# Patient Record
Sex: MALE | Race: WHITE | NOT HISPANIC OR LATINO | Employment: OTHER | URBAN - METROPOLITAN AREA
[De-identification: names, ages, dates, MRNs, and addresses within clinical notes are randomized per-mention and may not be internally consistent; named-entity substitution may affect disease eponyms.]

---

## 2024-08-24 ENCOUNTER — APPOINTMENT (OUTPATIENT)
Dept: RADIOLOGY | Facility: HOSPITAL | Age: 69
End: 2024-08-24
Payer: MEDICARE

## 2024-08-24 ENCOUNTER — HOSPITAL ENCOUNTER (EMERGENCY)
Facility: HOSPITAL | Age: 69
Discharge: HOME | End: 2024-08-24
Attending: STUDENT IN AN ORGANIZED HEALTH CARE EDUCATION/TRAINING PROGRAM
Payer: MEDICARE

## 2024-08-24 VITALS
HEIGHT: 72 IN | DIASTOLIC BLOOD PRESSURE: 85 MMHG | OXYGEN SATURATION: 95 % | WEIGHT: 230 LBS | BODY MASS INDEX: 31.15 KG/M2 | HEART RATE: 72 BPM | RESPIRATION RATE: 16 BRPM | SYSTOLIC BLOOD PRESSURE: 195 MMHG

## 2024-08-24 DIAGNOSIS — S73.005A DISLOCATION OF LEFT HIP, INITIAL ENCOUNTER (MULTI): Primary | ICD-10-CM

## 2024-08-24 DIAGNOSIS — R03.0 ELEVATED BLOOD PRESSURE READING: ICD-10-CM

## 2024-08-24 LAB
ABO GROUP (TYPE) IN BLOOD: NORMAL
ALBUMIN SERPL BCP-MCNC: 3.1 G/DL (ref 3.4–5)
ALP SERPL-CCNC: 40 U/L (ref 33–136)
ALT SERPL W P-5'-P-CCNC: 13 U/L (ref 10–52)
ANION GAP SERPL CALC-SCNC: 16 MMOL/L (ref 10–20)
ANTIBODY SCREEN: NORMAL
APTT PPP: 22 SECONDS (ref 27–38)
AST SERPL W P-5'-P-CCNC: 14 U/L (ref 9–39)
BASOPHILS # BLD AUTO: 0.05 X10*3/UL (ref 0–0.1)
BASOPHILS NFR BLD AUTO: 0.5 %
BILIRUB SERPL-MCNC: 0.4 MG/DL (ref 0–1.2)
BUN SERPL-MCNC: 12 MG/DL (ref 6–23)
CALCIUM SERPL-MCNC: 6.7 MG/DL (ref 8.6–10.3)
CHLORIDE SERPL-SCNC: 115 MMOL/L (ref 98–107)
CO2 SERPL-SCNC: 13 MMOL/L (ref 21–32)
CREAT SERPL-MCNC: 0.7 MG/DL (ref 0.5–1.3)
EGFRCR SERPLBLD CKD-EPI 2021: >90 ML/MIN/1.73M*2
EOSINOPHIL # BLD AUTO: 0.1 X10*3/UL (ref 0–0.7)
EOSINOPHIL NFR BLD AUTO: 1 %
ERYTHROCYTE [DISTWIDTH] IN BLOOD BY AUTOMATED COUNT: 13.6 % (ref 11.5–14.5)
GLUCOSE SERPL-MCNC: 76 MG/DL (ref 74–99)
HCT VFR BLD AUTO: 40.1 % (ref 41–52)
HGB BLD-MCNC: 13.9 G/DL (ref 13.5–17.5)
IMM GRANULOCYTES # BLD AUTO: 0.04 X10*3/UL (ref 0–0.7)
IMM GRANULOCYTES NFR BLD AUTO: 0.4 % (ref 0–0.9)
INR PPP: 1.1 (ref 0.9–1.1)
LYMPHOCYTES # BLD AUTO: 1.45 X10*3/UL (ref 1.2–4.8)
LYMPHOCYTES NFR BLD AUTO: 14.6 %
MCH RBC QN AUTO: 30.9 PG (ref 26–34)
MCHC RBC AUTO-ENTMCNC: 34.7 G/DL (ref 32–36)
MCV RBC AUTO: 89 FL (ref 80–100)
MONOCYTES # BLD AUTO: 0.96 X10*3/UL (ref 0.1–1)
MONOCYTES NFR BLD AUTO: 9.7 %
NEUTROPHILS # BLD AUTO: 7.34 X10*3/UL (ref 1.2–7.7)
NEUTROPHILS NFR BLD AUTO: 73.8 %
NRBC BLD-RTO: 0 /100 WBCS (ref 0–0)
PLATELET # BLD AUTO: 240 X10*3/UL (ref 150–450)
POTASSIUM SERPL-SCNC: 3.1 MMOL/L (ref 3.5–5.3)
PROT SERPL-MCNC: 5.1 G/DL (ref 6.4–8.2)
PROTHROMBIN TIME: 11.9 SECONDS (ref 9.8–12.8)
RBC # BLD AUTO: 4.5 X10*6/UL (ref 4.5–5.9)
RH FACTOR (ANTIGEN D): NORMAL
SODIUM SERPL-SCNC: 141 MMOL/L (ref 136–145)
WBC # BLD AUTO: 9.9 X10*3/UL (ref 4.4–11.3)

## 2024-08-24 PROCEDURE — 85610 PROTHROMBIN TIME: CPT

## 2024-08-24 PROCEDURE — 96374 THER/PROPH/DIAG INJ IV PUSH: CPT | Mod: 59 | Performed by: STUDENT IN AN ORGANIZED HEALTH CARE EDUCATION/TRAINING PROGRAM

## 2024-08-24 PROCEDURE — 2500000001 HC RX 250 WO HCPCS SELF ADMINISTERED DRUGS (ALT 637 FOR MEDICARE OP)

## 2024-08-24 PROCEDURE — 2500000005 HC RX 250 GENERAL PHARMACY W/O HCPCS: Performed by: STUDENT IN AN ORGANIZED HEALTH CARE EDUCATION/TRAINING PROGRAM

## 2024-08-24 PROCEDURE — 27265 TREAT HIP DISLOCATION: CPT | Mod: LT | Performed by: STUDENT IN AN ORGANIZED HEALTH CARE EDUCATION/TRAINING PROGRAM

## 2024-08-24 PROCEDURE — 80053 COMPREHEN METABOLIC PANEL: CPT

## 2024-08-24 PROCEDURE — 86900 BLOOD TYPING SEROLOGIC ABO: CPT

## 2024-08-24 PROCEDURE — 99152 MOD SED SAME PHYS/QHP 5/>YRS: CPT | Performed by: STUDENT IN AN ORGANIZED HEALTH CARE EDUCATION/TRAINING PROGRAM

## 2024-08-24 PROCEDURE — 36415 COLL VENOUS BLD VENIPUNCTURE: CPT

## 2024-08-24 PROCEDURE — 2500000004 HC RX 250 GENERAL PHARMACY W/ HCPCS (ALT 636 FOR OP/ED)

## 2024-08-24 PROCEDURE — 73502 X-RAY EXAM HIP UNI 2-3 VIEWS: CPT | Mod: LT

## 2024-08-24 PROCEDURE — 73502 X-RAY EXAM HIP UNI 2-3 VIEWS: CPT | Mod: LEFT SIDE | Performed by: RADIOLOGY

## 2024-08-24 PROCEDURE — 96361 HYDRATE IV INFUSION ADD-ON: CPT | Mod: 59 | Performed by: STUDENT IN AN ORGANIZED HEALTH CARE EDUCATION/TRAINING PROGRAM

## 2024-08-24 PROCEDURE — 2500000004 HC RX 250 GENERAL PHARMACY W/ HCPCS (ALT 636 FOR OP/ED): Performed by: STUDENT IN AN ORGANIZED HEALTH CARE EDUCATION/TRAINING PROGRAM

## 2024-08-24 PROCEDURE — 99285 EMERGENCY DEPT VISIT HI MDM: CPT | Mod: 25 | Performed by: STUDENT IN AN ORGANIZED HEALTH CARE EDUCATION/TRAINING PROGRAM

## 2024-08-24 PROCEDURE — 85025 COMPLETE CBC W/AUTO DIFF WBC: CPT

## 2024-08-24 PROCEDURE — 85730 THROMBOPLASTIN TIME PARTIAL: CPT

## 2024-08-24 PROCEDURE — 73501 X-RAY EXAM HIP UNI 1 VIEW: CPT | Mod: LT

## 2024-08-24 RX ORDER — POTASSIUM CHLORIDE 1.5 G/1.58G
40 POWDER, FOR SOLUTION ORAL ONCE
Status: COMPLETED | OUTPATIENT
Start: 2024-08-24 | End: 2024-08-24

## 2024-08-24 RX ORDER — PROPOFOL 10 MG/ML
100 INJECTION, EMULSION INTRAVENOUS ONCE
Status: COMPLETED | OUTPATIENT
Start: 2024-08-24 | End: 2024-08-24

## 2024-08-24 RX ORDER — PROPOFOL 10 MG/ML
100 INJECTION, EMULSION INTRAVENOUS AS NEEDED
Status: COMPLETED | OUTPATIENT
Start: 2024-08-24 | End: 2024-08-24

## 2024-08-24 RX ORDER — OXYCODONE HYDROCHLORIDE 5 MG/1
5 TABLET ORAL EVERY 6 HOURS PRN
Qty: 12 TABLET | Refills: 0 | Status: SHIPPED | OUTPATIENT
Start: 2024-08-24 | End: 2024-08-27

## 2024-08-24 RX ORDER — HYDROMORPHONE HYDROCHLORIDE 1 MG/ML
1 INJECTION, SOLUTION INTRAMUSCULAR; INTRAVENOUS; SUBCUTANEOUS ONCE
Status: COMPLETED | OUTPATIENT
Start: 2024-08-24 | End: 2024-08-24

## 2024-08-24 RX ORDER — PROPOFOL 10 MG/ML
0.5 INJECTION, EMULSION INTRAVENOUS AS NEEDED
Status: DISCONTINUED | OUTPATIENT
Start: 2024-08-24 | End: 2024-08-24

## 2024-08-24 RX ORDER — IBUPROFEN 800 MG/1
800 TABLET ORAL AS NEEDED
Qty: 9 TABLET | Refills: 0 | Status: SHIPPED | OUTPATIENT
Start: 2024-08-24 | End: 2024-08-27

## 2024-08-24 RX ADMIN — HYDROMORPHONE HYDROCHLORIDE 1 MG: 1 INJECTION, SOLUTION INTRAMUSCULAR; INTRAVENOUS; SUBCUTANEOUS at 07:55

## 2024-08-24 RX ADMIN — SODIUM CHLORIDE 1000 ML: 9 INJECTION, SOLUTION INTRAVENOUS at 09:50

## 2024-08-24 RX ADMIN — PROPOFOL 100 MG: 10 INJECTION, EMULSION INTRAVENOUS at 09:35

## 2024-08-24 RX ADMIN — POTASSIUM CHLORIDE 40 MEQ: 1.5 POWDER, FOR SOLUTION ORAL at 11:20

## 2024-08-24 RX ADMIN — Medication 2 L/MIN: at 09:20

## 2024-08-24 RX ADMIN — PROPOFOL 100 MG: 10 INJECTION, EMULSION INTRAVENOUS at 09:53

## 2024-08-24 SDOH — HEALTH STABILITY: MENTAL HEALTH: HAVE YOU WISHED YOU WERE DEAD OR WISHED YOU COULD GO TO SLEEP AND NOT WAKE UP?: NO

## 2024-08-24 SDOH — HEALTH STABILITY: MENTAL HEALTH: SUICIDE ASSESSMENT: ADULT (C-SSRS)

## 2024-08-24 SDOH — HEALTH STABILITY: MENTAL HEALTH: HAVE YOU ACTUALLY HAD ANY THOUGHTS OF KILLING YOURSELF?: NO

## 2024-08-24 SDOH — HEALTH STABILITY: MENTAL HEALTH: HAVE YOU EVER DONE ANYTHING, STARTED TO DO ANYTHING, OR PREPARED TO DO ANYTHING TO END YOUR LIFE?: NO

## 2024-08-24 ASSESSMENT — PAIN SCALES - GENERAL
PAINLEVEL_OUTOF10: 3
PAINLEVEL_OUTOF10: 6
PAINLEVEL_OUTOF10: 3
PAINLEVEL_OUTOF10: 6
PAINLEVEL_OUTOF10: 9
PAINLEVEL_OUTOF10: 6
PAINLEVEL_OUTOF10: 6
PAINLEVEL_OUTOF10: 3
PAINLEVEL_OUTOF10: 3
PAINLEVEL_OUTOF10: 6

## 2024-08-24 ASSESSMENT — PAIN DESCRIPTION - LOCATION
LOCATION: HIP
LOCATION: HIP

## 2024-08-24 ASSESSMENT — PAIN DESCRIPTION - PAIN TYPE
TYPE: ACUTE PAIN
TYPE: ACUTE PAIN

## 2024-08-24 ASSESSMENT — LIFESTYLE VARIABLES
EVER FELT BAD OR GUILTY ABOUT YOUR DRINKING: NO
EVER HAD A DRINK FIRST THING IN THE MORNING TO STEADY YOUR NERVES TO GET RID OF A HANGOVER: NO
HAVE PEOPLE ANNOYED YOU BY CRITICIZING YOUR DRINKING: NO
HAVE YOU EVER FELT YOU SHOULD CUT DOWN ON YOUR DRINKING: NO
TOTAL SCORE: 0

## 2024-08-24 ASSESSMENT — COLUMBIA-SUICIDE SEVERITY RATING SCALE - C-SSRS
6. HAVE YOU EVER DONE ANYTHING, STARTED TO DO ANYTHING, OR PREPARED TO DO ANYTHING TO END YOUR LIFE?: NO
1. IN THE PAST MONTH, HAVE YOU WISHED YOU WERE DEAD OR WISHED YOU COULD GO TO SLEEP AND NOT WAKE UP?: NO
2. HAVE YOU ACTUALLY HAD ANY THOUGHTS OF KILLING YOURSELF?: NO
6. HAVE YOU EVER DONE ANYTHING, STARTED TO DO ANYTHING, OR PREPARED TO DO ANYTHING TO END YOUR LIFE?: NO
1. SINCE LAST CONTACT, HAVE YOU WISHED YOU WERE DEAD OR WISHED YOU COULD GO TO SLEEP AND NOT WAKE UP?: NO
2. HAVE YOU ACTUALLY HAD ANY THOUGHTS OF KILLING YOURSELF?: NO

## 2024-08-24 ASSESSMENT — PAIN - FUNCTIONAL ASSESSMENT
PAIN_FUNCTIONAL_ASSESSMENT: 0-10
PAIN_FUNCTIONAL_ASSESSMENT: 0-10

## 2024-08-24 ASSESSMENT — PAIN DESCRIPTION - ORIENTATION: ORIENTATION: LEFT

## 2024-08-24 NOTE — ED PROCEDURE NOTE
Procedure  Moderate Sedation    Performed by: Archie Castillo MD  Authorized by: Archie Castillo MD    Consent:     Consent obtained:  Verbal    Consent given by:  Patient    Risks, benefits, and alternatives were discussed: yes      Risks discussed:  Prolonged hypoxia resulting in organ damage, prolonged sedation necessitating reversal and respiratory compromise necessitating ventilatory assistance and intubation  Universal protocol:     Procedure explained and questions answered to patient or proxy's satisfaction: yes      Relevant documents present and verified: yes      Immediately prior to procedure, a time out was called: yes      Patient identity confirmed:  Verbally with patient, arm band and hospital-assigned identification number  Indications:     Procedure performed:  Dislocation reduction    Procedure necessitating sedation performed by:  Physician performing sedation    Intended level of sedation:  Moderate  Pre-sedation assessment:     Time since last food or drink:  Last night    ASA classification: class 2 - patient with mild systemic disease      Mouth opening:  3 or more finger widths    Thyromental distance:  3 finger widths    Mallampati score:  III - soft palate, base of uvula visible    Neck mobility: normal      Pre-sedation assessments completed and reviewed: airway patency, anesthesia/sedation history, cardiovascular function, hydration status, mental status, nausea/vomiting, pain level, respiratory function and temperature      History of difficult intubation: no      Pre-sedation assessment completed:  8/24/2024 8:53 AM  Immediate pre-procedure details:     Reassessment: Patient reassessed immediately prior to procedure      Reviewed: vital signs      Verified: bag valve mask available, emergency equipment available, intubation equipment available, IV patency confirmed, oxygen available, reversal medications available and suction available    Procedure details (see MAR for exact dosages):      Preoxygenation:  Nasal cannula    Sedation:  Propofol    Analgesia:  Hydromorphone    Intra-procedure monitoring:  Blood pressure monitoring, continuous capnometry, frequent LOC assessments, frequent vital sign checks, continuous pulse oximetry and cardiac monitor    Intra-procedure events: none      Intra-procedure management:  Airway repositioning    Total sedation time (minutes):  10  Post-procedure details:     Attendance: Constant attendance by certified staff until patient recovered      Recovery: Patient returned to pre-procedure baseline      Estimated blood loss (see I/O flowsheets): no      Post-sedation assessments completed and reviewed: airway patency, cardiovascular function, hydration status, mental status, nausea/vomiting and pain level      Specimens recovered:  None    Patient is stable for discharge or admission: yes      Procedure completion:  Tolerated               Archie Castillo MD  08/24/24 6716     Full range of motion of upper and lower extremities, no joint tenderness/swelling.

## 2024-08-24 NOTE — PROGRESS NOTES
ED PHARMACIST PROCEDURAL SEDATION PARTICIPATION     Patient Name: Brandon Link                MRN: [id number]          Location: [place] [address]     Patient Weight (kg):       Wt Readings from Last 1 Encounters:   08/24/24 104 kg (230 lb)         Brandon Link presented to the ED requiring reduction of left hip.      PharmD at bedside for procurement, preparation, and administration of propofol for procedural sedation.      Dose administered:  100 mg x1 dose up front, additional 100 mg given throughout sedation for a total of 200 mg.         Dahiana LandersD  8/24/2024  10:00 AM

## 2024-08-24 NOTE — ED PROCEDURE NOTE
Procedure  Orthopaedic Injury Treatment - Lower Extremity    Performed by: Archie Castillo MD  Authorized by: Archie Castillo MD    Consent:     Consent obtained:  Written    Consent given by:  Patient    Risks, benefits, and alternatives were discussed: yes      Risks discussed:  Fracture, nerve damage, restricted joint movement and vascular damage  Universal protocol:     Relevant documents present and verified: yes      Test results available: yes      Imaging studies available: yes      Required blood products, implants, devices, and special equipment available: yes      Immediately prior to procedure, a time out was called: yes      Patient identity confirmed:  Verbally with patient, arm band and hospital-assigned identification number  Location:     Location:  Hip    Hip injury location:  L hip    Hip dislocation type: posterior      Etiology: spontaneous      Prosthesis: yes    Pre-procedure details:     Distal perfusion: normal      Range of motion: reduced    Sedation:     Sedation type:  Moderate sedation  Anesthesia:     Anesthesia method:  None  Procedure details:     Manipulation performed: yes      Reduction method: cpt. esteban technique and direct traction.    Reduction successful: yes      Reduction confirmed with imaging: yes      Immobilization:  Brace    Supplies used:  Prefabricated splint  Post-procedure details:     Neurological function: normal      Distal perfusion: normal      Range of motion: improved      Procedure completion:  Tolerated               Archie Castillo MD  08/24/24 1052

## 2024-08-24 NOTE — DISCHARGE INSTRUCTIONS
You were seen emergency room today for dislocation of your left hip.  We sedated you and were able to successfully replace the hip.  You were placed in knee immobilizer and provided with crutches.  At this time, you will be discharged home and recommended follow with a primary care provider and your orthopedic surgeon regarding her visit to the ED today.  Please discuss your visit and your slightly elevated blood pressure reading with your primary care provider.  Reasons to return the emergency room include worsening pain, difficulty with ambulation, chest pain, shortness of breath.

## 2024-08-24 NOTE — ED PROVIDER NOTES
HPI   No chief complaint on file.      Brandon is a 68-year-old male with [facility] to [facility] emergency department with left hip pain.  Patient states that over the past couple days, he has been experiencing intermittent, left groin pain.  This morning, when he rolled over to go use the restroom, he felt a pain and pop in his left hip.  Patient was unable to move his left leg without extreme pain and could not ambulate.  He subsequently called EMS and presented to the emergency department.  Patient denies any worsening numbness or tingling in his left lower extremity comparison to his baseline.  Patient reports that his left [facility].  He cannot recall the surgeon's name.  He denies any recent falls or trauma to the left hip.  Patient otherwise is feeling healthy denies any chest pain, shortness of breath, fever, chills, nausea, vomiting.              Patient History   No past medical history on file.  Past Surgical History:   Procedure Laterality Date   • MR HEAD ANGIO WO IV CONTRAST  6/28/2021    MR HEAD ANGIO WO IV CONTRAST 6/28/2021 [facility] CLINICAL LEGACY   • MR NECK ANGIO WO IV CONTRAST  6/28/2021    MR NECK ANGIO WO IV CONTRAST 6/28/2021 [facility] CLINICAL LEGACY     No family history on file.  Social History     Tobacco Use   • Smoking status: Not on file   • Smokeless tobacco: Not on file   Substance Use Topics   • Alcohol use: Not on file   • Drug use: Not on file       Physical Exam   ED Triage Vitals   Temp Pulse Resp BP   -- -- -- --      SpO2 Temp src Heart Rate Source Patient Position   -- -- -- --      BP Location FiO2 (%)     -- --       Physical Exam  Constitutional:       Appearance: He is obese.   HENT:      Mouth/Throat:      Mouth: Mucous membranes are moist.      Pharynx: Oropharynx is clear.   Eyes:      Extraocular Movements: Extraocular movements intact.   Cardiovascular:      Rate and Rhythm: Normal rate and regular  rhythm.      Pulses: Normal pulses.      Heart sounds: Normal heart sounds.   Pulmonary:      Effort: Pulmonary effort is normal.      Breath sounds: Normal breath sounds.   Abdominal:      General: Abdomen is flat.      Palpations: Abdomen is soft.   Musculoskeletal:         General: Tenderness and deformity present.      Cervical back: Normal range of motion. No rigidity.      Comments: Patient with tenderness palpation of the left hip.  There is a palpable defect of the left hip with concerns for dislocation.  The left leg is shortened.  Patient is neurovascularly intact distally with intact pedal pulses.  Right hip and leg unremarkable.   Neurological:      Mental Status: He is alert and oriented to person, place, and time.           ED Course & MDM   ED Course as of 08/24/24 2202   Sat Aug 24, 2024   0838 XR hip left with pelvis when performed 2 or 3 views  Superior dislocation of the femoral component of the left total hip  arthroplasty relative to the acetabular component.   [AH]   1055 This is a 68-year-old male with a past history of a left total hip arthroplasty about 3 months ago who presents with hip pain.  Patient states that he was laying in bed when he rolled over to his side.  He felt his left hip pop and was able to movement.  Has had pain there.  This happened at 5 AM.  No numbness or weakness.  Exam is well-appearing.  His leg is shortened on the left.  Neurovascular intact.  [facility]  We did attempt to contact the [facility] who stated that the physicians were not on-call and they were unable to call them unless the patient was being transferred there.  We did contact our orthopedic surgeons here who reviewed the case and recommended to attempt reduction in hearing.  The hip was reduced.  Please see the separate procedures for details regarding that.  It was a successful reduction.  Patient is placed in knee immobilizer and given crutches.  Advised on close follow-up with  orthopedic clinic.  Advised on return precautions and discharge. [DS]   1253 XR hip left with pelvis when performed 1 view  IMPRESSION:  Reduction of previously seen dislocation.   [AH]      ED Course User Index  [AH] Jacque Su PA-C  [DS] Archie Castillo MD         Diagnoses as of 08/24/24 2202   Dislocation of left hip, initial encounter (Multi)   Elevated blood pressure reading                 No data recorded                                 Medical Decision Making  Brandon is a 68-year-old male with recent [facility] presenting to the emergency department with left hip pain.  Patient states that over the past couple days, he has been experiencing intermittent, left groin pain.     Medical Decision Making: Patient does appear uncomfortable and in pain.  Vital signs reviewed and remarkable for elevated blood pressure reading of 233/102.  Patient is in a significant amount of pain.  He denies any chest pain or shortness of breath.  Patient with shortened left leg and palpable defect of the left hip concerning for left hip dislocation.  [facility] and he does not recall the surgeon's name.  I am unable to see any of the records regarding this procedure.  Workup included CBC, CMP, PT/INR, APTT, type and screen, x-ray of the left hip.  Patient received 0.5 mg of Dilaudid en route by EMS.  He was given another one mg upon presentation to the emergency room today. No leukocytosis on CBC.  H&H are stable.  Mild hypokalemia of 3.1.  Otherwise, no critical electrolyte finding or SHANE.  X-ray demonstrates superior dislocation of the femoral component of the left total hip arthroplasty relative to the acetabular component.  I attempted to get in contact with the [facility] where [facility], but was unsuccessful. He cannot recall the surgeon that performed the procedure.  Patient is stating that the hip likely displaced around 430/5  AM this morning.  I spoke with our orthopedic surgeon on-call, Dr. Lopez, who is recommending attempts at reduction.  Patient was sedated with propofol and hip was successfully reduced as shown on postreduction films.  Patient was placed in a knee immobilizer and provided with crutches.  He was monitored for several hours after sedation.  He is doing well and is alert and oriented x 3.  Patient to be discharged home and recommended follow-up with his primary care provider and the [facility] at the [facility] who [facility].  He was provided with a short prescription for oxycodone and ibuprofen for pain control.  OARRS reviewed prior to prescription.  We discussed risk of controlled substances including overdose and increased risk of falls.  Patient and wife agreeable.  Strict return precautions reviewed.  Patient agreeable to plan and all question concerns were addressed.     Differential diagnoses considered: Dislocation, fracture, etc.     Medications given: Dilaudid, Propofol      Diagnosis: Dislocation of left hip, elevated blood pressure reading    Plan: Discharge          Procedure  Procedures     Jacque Su PA-C  08/24/24 1253       Jacque Su PA-C  08/24/24 2202

## 2025-02-16 ENCOUNTER — HOSPITAL ENCOUNTER (OUTPATIENT)
Facility: HOSPITAL | Age: 70
Discharge: HOME | End: 2025-02-17
Attending: EMERGENCY MEDICINE | Admitting: ORTHOPAEDIC SURGERY
Payer: MEDICARE

## 2025-02-16 ENCOUNTER — APPOINTMENT (OUTPATIENT)
Dept: RADIOLOGY | Facility: HOSPITAL | Age: 70
End: 2025-02-16
Payer: MEDICARE

## 2025-02-16 DIAGNOSIS — T84.021A: ICD-10-CM

## 2025-02-16 DIAGNOSIS — S73.005A DISLOCATION OF LEFT HIP, INITIAL ENCOUNTER (MULTI): ICD-10-CM
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
## 2025-02-16 PROCEDURE — 36415 COLL VENOUS BLD VENIPUNCTURE: CPT | Performed by: PHYSICIAN ASSISTANT

## 2025-02-16 PROCEDURE — G0390 TRAUMA RESPONS W/HOSP CRITI: HCPCS

## 2025-02-16 PROCEDURE — 72170 X-RAY EXAM OF PELVIS: CPT

## 2025-02-16 PROCEDURE — 73552 X-RAY EXAM OF FEMUR 2/>: CPT | Mod: LT

## 2025-02-16 PROCEDURE — 80053 COMPREHEN METABOLIC PANEL: CPT | Performed by: PHYSICIAN ASSISTANT

## 2025-02-16 PROCEDURE — 85025 COMPLETE CBC W/AUTO DIFF WBC: CPT | Performed by: PHYSICIAN ASSISTANT

## 2025-02-16 RX ORDER — SODIUM CHLORIDE 9 MG/ML
125 INJECTION, SOLUTION INTRAVENOUS CONTINUOUS
Status: DISCONTINUED | OUTPATIENT
Start: 2025-02-16 | End: 2025-02-17

## 2025-02-16 RX ORDER — FENTANYL CITRATE 50 UG/ML
50 INJECTION, SOLUTION INTRAMUSCULAR; INTRAVENOUS ONCE
Status: COMPLETED | OUTPATIENT
Start: 2025-02-16 | End: 2025-02-17

## 2025-02-16 ASSESSMENT — PAIN DESCRIPTION - ORIENTATION: ORIENTATION: LEFT

## 2025-02-16 ASSESSMENT — PAIN DESCRIPTION - LOCATION: LOCATION: HIP

## 2025-02-16 ASSESSMENT — LIFESTYLE VARIABLES
EVER FELT BAD OR GUILTY ABOUT YOUR DRINKING: NO
HAVE PEOPLE ANNOYED YOU BY CRITICIZING YOUR DRINKING: NO
HAVE YOU EVER FELT YOU SHOULD CUT DOWN ON YOUR DRINKING: NO
EVER HAD A DRINK FIRST THING IN THE MORNING TO STEADY YOUR NERVES TO GET RID OF A HANGOVER: NO
TOTAL SCORE: 0

## 2025-02-16 ASSESSMENT — COLUMBIA-SUICIDE SEVERITY RATING SCALE - C-SSRS
1. IN THE PAST MONTH, HAVE YOU WISHED YOU WERE DEAD OR WISHED YOU COULD GO TO SLEEP AND NOT WAKE UP?: NO
6. HAVE YOU EVER DONE ANYTHING, STARTED TO DO ANYTHING, OR PREPARED TO DO ANYTHING TO END YOUR LIFE?: NO
2. HAVE YOU ACTUALLY HAD ANY THOUGHTS OF KILLING YOURSELF?: NO

## 2025-02-16 ASSESSMENT — PAIN DESCRIPTION - PAIN TYPE: TYPE: ACUTE PAIN

## 2025-02-16 ASSESSMENT — PAIN SCALES - GENERAL: PAINLEVEL_OUTOF10: 3

## 2025-02-16 ASSESSMENT — PAIN - FUNCTIONAL ASSESSMENT: PAIN_FUNCTIONAL_ASSESSMENT: 0-10

## 2025-02-17 ENCOUNTER — PREP FOR PROCEDURE (OUTPATIENT)
Dept: ORTHOPEDICS | Facility: HOSPITAL | Age: 70
End: 2025-02-17

## 2025-02-17 ENCOUNTER — HOSPITAL ENCOUNTER (OUTPATIENT)
Facility: HOSPITAL | Age: 70
Setting detail: OUTPATIENT SURGERY
End: 2025-02-17
Attending: ORTHOPAEDIC SURGERY | Admitting: ORTHOPAEDIC SURGERY
Payer: MEDICARE

## 2025-02-17 ENCOUNTER — APPOINTMENT (OUTPATIENT)
Dept: RADIOLOGY | Facility: HOSPITAL | Age: 70
End: 2025-02-17
Payer: MEDICARE

## 2025-02-17 ENCOUNTER — ANESTHESIA (OUTPATIENT)
Dept: OPERATING ROOM | Facility: HOSPITAL | Age: 70
End: 2025-02-17
Payer: MEDICARE

## 2025-02-17 ENCOUNTER — ANESTHESIA EVENT (OUTPATIENT)
Dept: OPERATING ROOM | Facility: HOSPITAL | Age: 70
End: 2025-02-17
Payer: MEDICARE

## 2025-02-17 ENCOUNTER — APPOINTMENT (OUTPATIENT)
Dept: CARDIOLOGY | Facility: HOSPITAL | Age: 70
End: 2025-02-17
Payer: MEDICARE

## 2025-02-17 VITALS
TEMPERATURE: 96.8 F | OXYGEN SATURATION: 91 % | DIASTOLIC BLOOD PRESSURE: 83 MMHG | RESPIRATION RATE: 18 BRPM | BODY MASS INDEX: 33.2 KG/M2 | HEART RATE: 90 BPM | SYSTOLIC BLOOD PRESSURE: 168 MMHG | HEIGHT: 70 IN | WEIGHT: 231.92 LBS

## 2025-02-17 DIAGNOSIS — T84.021A: ICD-10-CM

## 2025-02-17 DIAGNOSIS — Z96.649 FAILED TOTAL HIP ARTHROPLASTY WITH DISLOCATION, INITIAL ENCOUNTER (CMS-HCC): Primary | ICD-10-CM

## 2025-02-17 DIAGNOSIS — T84.028A FAILED TOTAL HIP ARTHROPLASTY WITH DISLOCATION, INITIAL ENCOUNTER (CMS-HCC): Primary | ICD-10-CM
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
## 2025-02-17 PROBLEM — M24.452 RECURRENT DISLOCATION, LEFT HIP: Status: ACTIVE | Noted: 2025-02-17

## 2025-02-17 PROBLEM — M24.459 RECURRENT DISLOCATION OF HIP: Status: ACTIVE | Noted: 2025-02-17

## 2025-02-17 LAB
ABO GROUP (TYPE) IN BLOOD: NORMAL
ALBUMIN SERPL BCP-MCNC: 4.4 G/DL (ref 3.4–5)
ALP SERPL-CCNC: 53 U/L (ref 33–136)
ALT SERPL W P-5'-P-CCNC: 21 U/L (ref 10–52)
ANION GAP SERPL CALC-SCNC: 14 MMOL/L (ref 10–20)
ANTIBODY SCREEN: NORMAL
AST SERPL W P-5'-P-CCNC: 20 U/L (ref 9–39)
BASOPHILS # BLD AUTO: 0.04 X10*3/UL (ref 0–0.1)
BASOPHILS NFR BLD AUTO: 0.5 %
BILIRUB SERPL-MCNC: 0.2 MG/DL (ref 0–1.2)
BUN SERPL-MCNC: 18 MG/DL (ref 6–23)
CALCIUM SERPL-MCNC: 9.1 MG/DL (ref 8.6–10.3)
CHLORIDE SERPL-SCNC: 103 MMOL/L (ref 98–107)
CO2 SERPL-SCNC: 23 MMOL/L (ref 21–32)
CREAT SERPL-MCNC: 0.95 MG/DL (ref 0.5–1.3)
EGFRCR SERPLBLD CKD-EPI 2021: 87 ML/MIN/1.73M*2
EOSINOPHIL # BLD AUTO: 0.14 X10*3/UL (ref 0–0.7)
EOSINOPHIL NFR BLD AUTO: 1.8 %
ERYTHROCYTE [DISTWIDTH] IN BLOOD BY AUTOMATED COUNT: 12.9 % (ref 11.5–14.5)
GLUCOSE BLD MANUAL STRIP-MCNC: 134 MG/DL (ref 74–99)
GLUCOSE SERPL-MCNC: 149 MG/DL (ref 74–99)
HCT VFR BLD AUTO: 44.6 % (ref 41–52)
HGB BLD-MCNC: 15.1 G/DL (ref 13.5–17.5)
IMM GRANULOCYTES # BLD AUTO: 0.05 X10*3/UL (ref 0–0.7)
IMM GRANULOCYTES NFR BLD AUTO: 0.6 % (ref 0–0.9)
INR PPP: 1 (ref 0.9–1.1)
LYMPHOCYTES # BLD AUTO: 1.12 X10*3/UL (ref 1.2–4.8)
LYMPHOCYTES NFR BLD AUTO: 14.3 %
MCH RBC QN AUTO: 30.8 PG (ref 26–34)
MCHC RBC AUTO-ENTMCNC: 33.9 G/DL (ref 32–36)
MCV RBC AUTO: 91 FL (ref 80–100)
MONOCYTES # BLD AUTO: 0.5 X10*3/UL (ref 0.1–1)
MONOCYTES NFR BLD AUTO: 6.4 %
NEUTROPHILS # BLD AUTO: 5.97 X10*3/UL (ref 1.2–7.7)
NEUTROPHILS NFR BLD AUTO: 76.4 %
NRBC BLD-RTO: 0 /100 WBCS (ref 0–0)
PLATELET # BLD AUTO: 238 X10*3/UL (ref 150–450)
POTASSIUM SERPL-SCNC: 4.1 MMOL/L (ref 3.5–5.3)
PROT SERPL-MCNC: 7.6 G/DL (ref 6.4–8.2)
PROTHROMBIN TIME: 11.4 SECONDS (ref 9.8–12.8)
RBC # BLD AUTO: 4.9 X10*6/UL (ref 4.5–5.9)
RH FACTOR (ANTIGEN D): NORMAL
SODIUM SERPL-SCNC: 136 MMOL/L (ref 136–145)
WBC # BLD AUTO: 7.8 X10*3/UL (ref 4.4–11.3)

## 2025-02-17 PROCEDURE — 2500000005 HC RX 250 GENERAL PHARMACY W/O HCPCS: Performed by: PHYSICIAN ASSISTANT

## 2025-02-17 PROCEDURE — 71045 X-RAY EXAM CHEST 1 VIEW: CPT | Mod: FOREIGN READ | Performed by: RADIOLOGY

## 2025-02-17 PROCEDURE — 27266 TREAT HIP DISLOCATION: CPT | Performed by: ORTHOPAEDIC SURGERY

## 2025-02-17 PROCEDURE — 85610 PROTHROMBIN TIME: CPT | Performed by: PHYSICIAN ASSISTANT

## 2025-02-17 PROCEDURE — 7100000010 HC PHASE TWO TIME - EACH INCREMENTAL 1 MINUTE: Performed by: ORTHOPAEDIC SURGERY

## 2025-02-17 PROCEDURE — 2500000004 HC RX 250 GENERAL PHARMACY W/ HCPCS (ALT 636 FOR OP/ED): Performed by: EMERGENCY MEDICINE

## 2025-02-17 PROCEDURE — 2500000004 HC RX 250 GENERAL PHARMACY W/ HCPCS (ALT 636 FOR OP/ED): Performed by: ANESTHESIOLOGY

## 2025-02-17 PROCEDURE — A27266 PR CLOSED RX POST HIP FIX DISLOC,ANESTH: Performed by: ANESTHESIOLOGY

## 2025-02-17 PROCEDURE — 3700000002 HC GENERAL ANESTHESIA TIME - EACH INCREMENTAL 1 MINUTE: Performed by: ORTHOPAEDIC SURGERY

## 2025-02-17 PROCEDURE — 7100000001 HC RECOVERY ROOM TIME - INITIAL BASE CHARGE: Performed by: ORTHOPAEDIC SURGERY

## 2025-02-17 PROCEDURE — 76000 FLUOROSCOPY <1 HR PHYS/QHP: CPT

## 2025-02-17 PROCEDURE — 2500000004 HC RX 250 GENERAL PHARMACY W/ HCPCS (ALT 636 FOR OP/ED)

## 2025-02-17 PROCEDURE — 73501 X-RAY EXAM HIP UNI 1 VIEW: CPT | Mod: LT

## 2025-02-17 PROCEDURE — 96360 HYDRATION IV INFUSION INIT: CPT | Mod: 59

## 2025-02-17 PROCEDURE — 2720000007 HC OR 272 NO HCPCS: Performed by: ORTHOPAEDIC SURGERY

## 2025-02-17 PROCEDURE — 99292 CRITICAL CARE ADDL 30 MIN: CPT | Performed by: PHYSICIAN ASSISTANT

## 2025-02-17 PROCEDURE — 7100000009 HC PHASE TWO TIME - INITIAL BASE CHARGE: Performed by: ORTHOPAEDIC SURGERY

## 2025-02-17 PROCEDURE — G0378 HOSPITAL OBSERVATION PER HR: HCPCS

## 2025-02-17 PROCEDURE — 71045 X-RAY EXAM CHEST 1 VIEW: CPT

## 2025-02-17 PROCEDURE — 3600000007 HC OR TIME - EACH INCREMENTAL 1 MINUTE - PROCEDURE LEVEL TWO: Performed by: ORTHOPAEDIC SURGERY

## 2025-02-17 PROCEDURE — 70450 CT HEAD/BRAIN W/O DYE: CPT | Performed by: RADIOLOGY

## 2025-02-17 PROCEDURE — 73552 X-RAY EXAM OF FEMUR 2/>: CPT | Mod: LEFT SIDE | Performed by: RADIOLOGY

## 2025-02-17 PROCEDURE — 99222 1ST HOSP IP/OBS MODERATE 55: CPT | Performed by: INTERNAL MEDICINE

## 2025-02-17 PROCEDURE — 99140 ANES COMP EMERGENCY COND: CPT | Performed by: ANESTHESIOLOGY

## 2025-02-17 PROCEDURE — 7100000002 HC RECOVERY ROOM TIME - EACH INCREMENTAL 1 MINUTE: Performed by: ORTHOPAEDIC SURGERY

## 2025-02-17 PROCEDURE — 72125 CT NECK SPINE W/O DYE: CPT | Performed by: RADIOLOGY

## 2025-02-17 PROCEDURE — 70450 CT HEAD/BRAIN W/O DYE: CPT

## 2025-02-17 PROCEDURE — 2500000004 HC RX 250 GENERAL PHARMACY W/ HCPCS (ALT 636 FOR OP/ED): Performed by: INTERNAL MEDICINE

## 2025-02-17 PROCEDURE — 86901 BLOOD TYPING SEROLOGIC RH(D): CPT | Performed by: PHYSICIAN ASSISTANT

## 2025-02-17 PROCEDURE — 72125 CT NECK SPINE W/O DYE: CPT

## 2025-02-17 PROCEDURE — 93005 ELECTROCARDIOGRAM TRACING: CPT

## 2025-02-17 PROCEDURE — 72170 X-RAY EXAM OF PELVIS: CPT | Mod: FOREIGN READ | Performed by: RADIOLOGY

## 2025-02-17 PROCEDURE — 3700000001 HC GENERAL ANESTHESIA TIME - INITIAL BASE CHARGE: Performed by: ORTHOPAEDIC SURGERY

## 2025-02-17 PROCEDURE — 2500000004 HC RX 250 GENERAL PHARMACY W/ HCPCS (ALT 636 FOR OP/ED): Performed by: PHYSICIAN ASSISTANT

## 2025-02-17 PROCEDURE — 82947 ASSAY GLUCOSE BLOOD QUANT: CPT

## 2025-02-17 PROCEDURE — 99221 1ST HOSP IP/OBS SF/LOW 40: CPT | Performed by: ORTHOPAEDIC SURGERY

## 2025-02-17 PROCEDURE — 3600000002 HC OR TIME - INITIAL BASE CHARGE - PROCEDURE LEVEL TWO: Performed by: ORTHOPAEDIC SURGERY

## 2025-02-17 PROCEDURE — 36415 COLL VENOUS BLD VENIPUNCTURE: CPT | Performed by: PHYSICIAN ASSISTANT

## 2025-02-17 PROCEDURE — 96361 HYDRATE IV INFUSION ADD-ON: CPT | Mod: 59

## 2025-02-17 RX ORDER — FENTANYL CITRATE 50 UG/ML
INJECTION, SOLUTION INTRAMUSCULAR; INTRAVENOUS AS NEEDED
Status: DISCONTINUED | OUTPATIENT
Start: 2025-02-17 | End: 2025-02-17

## 2025-02-17 RX ORDER — FENTANYL CITRATE 50 UG/ML
50 INJECTION, SOLUTION INTRAMUSCULAR; INTRAVENOUS ONCE
Status: COMPLETED | OUTPATIENT
Start: 2025-02-17 | End: 2025-02-17

## 2025-02-17 RX ORDER — HYDROMORPHONE HYDROCHLORIDE 0.2 MG/ML
0.2 INJECTION INTRAMUSCULAR; INTRAVENOUS; SUBCUTANEOUS
Status: DISCONTINUED | OUTPATIENT
Start: 2025-02-17 | End: 2025-02-17 | Stop reason: HOSPADM

## 2025-02-17 RX ORDER — ACETAMINOPHEN 325 MG/1
650 TABLET ORAL EVERY 4 HOURS PRN
Status: DISCONTINUED | OUTPATIENT
Start: 2025-02-17 | End: 2025-02-17 | Stop reason: HOSPADM

## 2025-02-17 RX ORDER — PROPOFOL 10 MG/ML
100 INJECTION, EMULSION INTRAVENOUS ONCE
Status: COMPLETED | OUTPATIENT
Start: 2025-02-17 | End: 2025-02-17

## 2025-02-17 RX ORDER — DEXTROSE 50 % IN WATER (D50W) INTRAVENOUS SYRINGE
12.5
Status: DISCONTINUED | OUTPATIENT
Start: 2025-02-17 | End: 2025-02-17 | Stop reason: HOSPADM

## 2025-02-17 RX ORDER — MIDAZOLAM HYDROCHLORIDE 1 MG/ML
INJECTION, SOLUTION INTRAMUSCULAR; INTRAVENOUS AS NEEDED
Status: DISCONTINUED | OUTPATIENT
Start: 2025-02-17 | End: 2025-02-17

## 2025-02-17 RX ORDER — PROPOFOL 10 MG/ML
0.5 INJECTION, EMULSION INTRAVENOUS AS NEEDED
Status: DISCONTINUED | OUTPATIENT
Start: 2025-02-17 | End: 2025-02-17 | Stop reason: HOSPADM

## 2025-02-17 RX ORDER — FENTANYL CITRATE 50 UG/ML
INJECTION, SOLUTION INTRAMUSCULAR; INTRAVENOUS
Status: COMPLETED
Start: 2025-02-17 | End: 2025-02-17

## 2025-02-17 RX ORDER — ENOXAPARIN SODIUM 100 MG/ML
40 INJECTION SUBCUTANEOUS EVERY 24 HOURS
Status: DISCONTINUED | OUTPATIENT
Start: 2025-02-17 | End: 2025-02-17 | Stop reason: HOSPADM

## 2025-02-17 RX ORDER — SODIUM CHLORIDE 9 MG/ML
125 INJECTION, SOLUTION INTRAVENOUS CONTINUOUS
Status: DISCONTINUED | OUTPATIENT
Start: 2025-02-17 | End: 2025-02-17 | Stop reason: HOSPADM

## 2025-02-17 RX ORDER — ROCURONIUM BROMIDE 10 MG/ML
INJECTION, SOLUTION INTRAVENOUS AS NEEDED
Status: DISCONTINUED | OUTPATIENT
Start: 2025-02-17 | End: 2025-02-17

## 2025-02-17 RX ORDER — ALBUTEROL SULFATE 0.83 MG/ML
2.5 SOLUTION RESPIRATORY (INHALATION) EVERY 2 HOUR PRN
Status: DISCONTINUED | OUTPATIENT
Start: 2025-02-17 | End: 2025-02-17 | Stop reason: HOSPADM

## 2025-02-17 RX ORDER — INSULIN LISPRO 100 [IU]/ML
0-5 INJECTION, SOLUTION INTRAVENOUS; SUBCUTANEOUS EVERY 4 HOURS
Status: DISCONTINUED | OUTPATIENT
Start: 2025-02-17 | End: 2025-02-17 | Stop reason: HOSPADM

## 2025-02-17 RX ORDER — SUCCINYLCHOLINE/SOD CL,ISO/PF 100 MG/5ML
SYRINGE (ML) INTRAVENOUS AS NEEDED
Status: DISCONTINUED | OUTPATIENT
Start: 2025-02-17 | End: 2025-02-17

## 2025-02-17 RX ORDER — ALBUTEROL SULFATE 0.83 MG/ML
2.5 SOLUTION RESPIRATORY (INHALATION) EVERY 6 HOURS PRN
Status: DISCONTINUED | OUTPATIENT
Start: 2025-02-17 | End: 2025-02-17

## 2025-02-17 RX ORDER — DEXTROSE 50 % IN WATER (D50W) INTRAVENOUS SYRINGE
25
Status: DISCONTINUED | OUTPATIENT
Start: 2025-02-17 | End: 2025-02-17 | Stop reason: HOSPADM

## 2025-02-17 RX ADMIN — ROCURONIUM BROMIDE 5 MG: 50 INJECTION, SOLUTION INTRAVENOUS at 07:15

## 2025-02-17 RX ADMIN — FENTANYL CITRATE 50 MCG: 50 INJECTION INTRAMUSCULAR; INTRAVENOUS at 01:57

## 2025-02-17 RX ADMIN — PROPOFOL 100 MG: 10 INJECTION, EMULSION INTRAVENOUS at 01:40

## 2025-02-17 RX ADMIN — FENTANYL CITRATE 50 MCG: 50 INJECTION, SOLUTION INTRAMUSCULAR; INTRAVENOUS at 07:14

## 2025-02-17 RX ADMIN — FENTANYL CITRATE 50 MCG: 50 INJECTION INTRAMUSCULAR; INTRAVENOUS at 01:12

## 2025-02-17 RX ADMIN — PROPOFOL 50 MG: 10 INJECTION, EMULSION INTRAVENOUS at 07:16

## 2025-02-17 RX ADMIN — Medication 4 L/MIN: at 02:15

## 2025-02-17 RX ADMIN — FENTANYL CITRATE 50 MCG: 50 INJECTION INTRAMUSCULAR; INTRAVENOUS at 06:00

## 2025-02-17 RX ADMIN — SODIUM CHLORIDE 125 ML/HR: 9 INJECTION, SOLUTION INTRAVENOUS at 00:12

## 2025-02-17 RX ADMIN — Medication 100 MG: at 07:16

## 2025-02-17 RX ADMIN — FENTANYL CITRATE 50 MCG: 50 INJECTION, SOLUTION INTRAMUSCULAR; INTRAVENOUS at 01:57

## 2025-02-17 RX ADMIN — PROPOFOL 40 MG: 10 INJECTION, EMULSION INTRAVENOUS at 01:52

## 2025-02-17 RX ADMIN — FENTANYL CITRATE 50 MCG: 50 INJECTION INTRAMUSCULAR; INTRAVENOUS at 00:11

## 2025-02-17 RX ADMIN — MIDAZOLAM 2 MG: 1 INJECTION INTRAMUSCULAR; INTRAVENOUS at 07:14

## 2025-02-17 RX ADMIN — SODIUM CHLORIDE 125 ML/HR: 9 INJECTION, SOLUTION INTRAVENOUS at 04:39

## 2025-02-17 SDOH — HEALTH STABILITY: MENTAL HEALTH: CURRENT SMOKER: 0

## 2025-02-17 ASSESSMENT — PAIN - FUNCTIONAL ASSESSMENT
PAIN_FUNCTIONAL_ASSESSMENT: 0-10

## 2025-02-17 ASSESSMENT — ENCOUNTER SYMPTOMS
SORE THROAT: 0
CONSTIPATION: 0
COUGH: 0
WOUND: 0
DIARRHEA: 0
APPETITE CHANGE: 0
MYALGIAS: 0
CHILLS: 0
WHEEZING: 0
DYSURIA: 0
HALLUCINATIONS: 0
NAUSEA: 0
ARTHRALGIAS: 1
CONFUSION: 0
ABDOMINAL PAIN: 0
SHORTNESS OF BREATH: 0
HEADACHES: 0
PALPITATIONS: 0
DIFFICULTY URINATING: 0
HEMATURIA: 0
FEVER: 0
DIZZINESS: 0
VOMITING: 0

## 2025-02-17 ASSESSMENT — PAIN SCALES - GENERAL
PAINLEVEL_OUTOF10: 0 - NO PAIN
PAINLEVEL_OUTOF10: 8
PAINLEVEL_OUTOF10: 10 - WORST POSSIBLE PAIN
PAINLEVEL_OUTOF10: 7
PAIN_LEVEL: 0

## 2025-02-17 ASSESSMENT — PAIN DESCRIPTION - LOCATION: LOCATION: HIP

## 2025-02-17 ASSESSMENT — PAIN DESCRIPTION - ORIENTATION: ORIENTATION: LEFT

## 2025-02-17 NOTE — ED TRIAGE NOTES
PT arrive via EMS with c/o slipping and falling on the ice. PT states he laid on the ground for about 15 minutes. Pts son pulled him into the house. PT is on Plavix.

## 2025-02-17 NOTE — ED PROVIDER NOTES
HPI   Chief Complaint   Patient presents with    Fall       69-year-old male with a significant past medical/surgical history of [facility] presents via EMS from home for injuries secondary to a mechanical fall.  The patient states that he was outside when he slipped and fell injuring his left hip.  He suspects the hip may be dislocated.  He reports that he was not able to bear weight.  EMS reported shortening and internal rotation to the left lower extremity.  He is unaware if he struck his head and is not sure if he lost consciousness.  The patient states that he is currently on Plavix.  He denies any chest pain or shortness of breath.  He does report consuming approximately 2 alcoholic beverages this evening.              Patient History   No past medical history on file.  Past Surgical History:   Procedure Laterality Date    MR HEAD ANGIO WO IV CONTRAST  6/28/2021    MR HEAD ANGIO WO IV CONTRAST 6/28/2021 [facility] CLINICAL LEGACY    MR NECK ANGIO WO IV CONTRAST  6/28/2021    MR NECK ANGIO WO IV CONTRAST 6/28/2021 [facility] CLINICAL LEGACY     No family history on file.  Social History     Tobacco Use    Smoking status: Unknown    Smokeless tobacco: Not on file   Substance Use Topics    Alcohol use: Not on file    Drug use: Not on file       Physical Exam   ED Triage Vitals   Temperature Heart Rate Respirations BP   02/16/25 2353 02/16/25 2351 02/16/25 2351 02/16/25 2351   36.2 °C (97.2 °F) 58 20 161/80      Pulse Ox Temp Source Heart Rate Source Patient Position   02/16/25 2351 02/16/25 2353 -- --   98 % Temporal        BP Location FiO2 (%)     -- --             Physical Exam  Vitals and nursing note reviewed.   Constitutional:       General: He is not in acute distress.     Appearance: Normal appearance. He is normal weight. He is not ill-appearing, toxic-appearing or diaphoretic.   HENT:      Head: Normocephalic and atraumatic.      Mouth/Throat:      Mouth: Mucous membranes are moist.    Eyes:      Extraocular Movements: Extraocular movements intact.      Conjunctiva/sclera: Conjunctivae normal.   Neck:      Comments: No midline cervical spine tenderness  Cardiovascular:      Rate and Rhythm: Normal rate and regular rhythm.      Pulses: Normal pulses.   Pulmonary:      Effort: Pulmonary effort is normal. No respiratory distress.      Breath sounds: Normal breath sounds.   Abdominal:      General: Abdomen is flat. There is no distension.      Palpations: Abdomen is soft.      Tenderness: There is no abdominal tenderness. There is no guarding or rebound.   Musculoskeletal:      Cervical back: Normal range of motion and neck supple.      Comments: Tenderness on palpation to the left anterolateral hip.  There is obvious shortening and  internal rotation to the left lower extremity.  The patient is neurovascularly intact with soft compartments and easily palpable distal pulses.  There is no other bony tenderness appreciated on exam   Skin:     General: Skin is warm and dry.      Capillary Refill: Capillary refill takes less than 2 seconds.   Neurological:      General: No focal deficit present.      Mental Status: He is alert and oriented to person, place, and time.   Psychiatric:         Mood and Affect: Mood normal.         Behavior: Behavior normal.         Thought Content: Thought content normal.         Judgment: Judgment normal.           ED Course & MDM   ED Course as of 02/17/25 0206   Mon Feb 17, 2025   0034 Pt seen with ROMIE - this is a 69yM who presents with fall on ice, hx L hip replacement, also on plavix.  Xray interpreted by me with dislocation of his L hip hardware. [EK]   0052 CT IMPRESSION:  1.  No acute intracranial hemorrhage or depressed calvarial fracture.  2. No acute fracture at the cervical spine. Degenerative changes.   [DS]   0052 IMPRESSION:  Complete superior dislocation of the left hip prosthesis.  No evidence  of acute displaced fracture.   [DS]   0107 EKG interpreted by  me sinus rhythm with short NV normal axis otherwise normal intervals no ischemic changes [EK]      ED Course User Index  [DS] Vasu Farley PA-C  [EK] Elyse H Klerman, MD         Diagnoses as of 02/17/25 0206   Fall, initial encounter   Dislocation of left hip, initial encounter (Multi)                 No data recorded     Jules Coma Scale Score: 15 (02/16/25 2354 : Karo Galaviz RN)                           Medical Decision Making    Medical Decision Making & ED Course  Medical Decision Making:  Patient seen evaluated for injury secondary to a fall.  Given this along with the use of Plavix and alcohol use today a limited trauma activation was called.  Patient was found to have obvious shortening and internal rotation to the left lower extremity.  He does have a significant past medical history of gatient reported that he was unaware if he hit his head or lost consciousness left total hip arthroplasty.  CT imaging of the head neck was obtained and found to be negative.  Radiographs unfortunately revealed a complete superior dislocation of the left hip prosthesis with no evidence of acute fracture.  The patient was notified of the findings.  Recommended conscious sedation with reduction.  Risk versus benefits was discussed at length with patient and family.  Patient verbalized understanding of the risks of the procedure and was agreeable to having the procedure.  Consent form was signed.  Respiratory therapy was called to bedside.  Patient was placed on a cardiac monitor with capnography.  Multiple attempts were made to reduce the hip however this was unsuccessful.  After multiple attempts the patient's lower extremity was reassessed and continues to be well-perfused.  [facility]  Patient cannot recall the name of his surgeon.  He was recently evaluated this past August 2024 for a similar encounter.  At that encounter the patient was found to  have a dislocation which was successfully reduced at that time. Case was discussed with Dr. Hall who requested that the patient remain in the ED and remain comfortable.  He will be in in the morning to assess the patient for reduction. The patient family were updated.  --  Scoring Tools Utilized: None    Differential diagnoses considered include but are not limited to: Hip dislocation, hip fracture, hip contusion     Social Determinants of Health which Significantly Impact Care: None identified The following actions were taken to address these social determinants: None    EKG Independent Interpretation: EKG interpreted by myself. Please see ED Course for full interpretation.    Independent Result Review and Interpretation: Relevant laboratory and radiographic results were reviewed and independently interpreted by myself.  As necessary, they are commented on in the ED Course.    Chronic conditions affecting the patient's care: None    The patient was discussed with the following consultants/services: Dr. Hall     Care Considerations: As documented above in MDM    ED Course:     Disposition  Pending ortho evaluation     This was a shared visit with an ED attending.  The patient was seen and discussed with the ED attending    Vasu Farley PA-C  Emergency Medicine            Procedure  Critical Care    Performed by: Vasu Farley PA-C  Authorized by: Vasu Farley PA-C    Critical care provider statement:     Critical care time (minutes):  75    Critical care start time:  2/17/2025 12:00 AM    Critical care end time:  2/17/2025 1:15 AM    Critical care time was exclusive of:  Separately billable procedures and treating other patients    Critical care was necessary to treat or prevent imminent or life-threatening deterioration of the following conditions:  Trauma    Critical care was time spent personally by me on the following activities:  Ordering and performing treatments and interventions, ordering and review  of radiographic studies, pulse oximetry, re-evaluation of patient's condition, review of old charts, discussions with consultants and examination of patient       Vasu Farley PA-C  02/17/25 0233       Vasu Farley PA-C  02/17/25 0235

## 2025-02-17 NOTE — OP NOTE
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    Procedure Summary  Anesthesia: Anesthesia type not filed in the log.  ASA: ASA status not filed in the log.  Estimated Blood Loss: 0mL    Indications: 69-year-old with a previous left total hip arthroplasty performed at the [facility] in .  He has had 1 previous dislocation event.  He fell last night and sustained a dislocation.  Treatment options including no treatment were reviewed and the decision was made to proceed with closed reduction.    Description of procedure: Patient was brought in the operating room.  General anesthesia was performed.  Close reduction of the hip was performed.  Intraoperative x-ray was obtained to confirm reduction.  He was placed into a knee immobilizer and taken to the recovery room in stable condition.               Anesthesia Record               Intraprocedure I/O Totals       None           Specimen: No specimens collected             Task Performed by RNFA or Surgical Assistant:  Assistance with the reduction by applying counter pressure      Attending Attestation: I performed the procedure.    Brandon Burt  Phone Number: [phone]

## 2025-02-17 NOTE — H&P
History Of Present Illness  Brandon Link is a 69 y.o. male PMHx HTN, HLD, CAD s/p stents, COPD, DM2, GERD, BPH presented to [facility] ED for fall and left hip pain.  Patient was outside when he slipped and fell hurting his left hip.  He has a history of left hip replacement and also dislocation.  In the ED, imaging showed dislocation of left hip.  Attempts to reduce the hip in the ED not successful.  Orthopedic surgery contacted and  will see patient.     Past Medical History  No past medical history on file.    Surgical History  Past Surgical History:   Procedure Laterality Date    MR HEAD ANGIO WO IV CONTRAST  6/28/2021    MR HEAD ANGIO WO IV CONTRAST 6/28/2021 [facility] CLINICAL [facility]GAC[facility]    MR NECK ANGIO WO IV CONTRAST  6/28/2021    MR NECK ANGIO WO IV CONTRAST 6/28/2021 [facility] CLINICAL LEGAC[facility]        Social History  He has no history on file for tobacco use, alcohol use, and drug use.    Family History  No family history on file.     Allergies  Beta-blockers (beta-adrenergic blocking agts) and Atorvastatin    Review of Systems   Constitutional:  Negative for appetite change, chills and fever.   HENT:  Negative for congestion, ear pain and sore throat.    Eyes:  Negative for visual disturbance.   Respiratory:  Negative for cough, shortness of breath and wheezing.    Cardiovascular:  Negative for chest pain, palpitations and leg swelling.   Gastrointestinal:  Negative for abdominal pain, constipation, diarrhea, nausea and vomiting.   Genitourinary:  Negative for difficulty urinating, dysuria and hematuria.   Musculoskeletal:  Positive for arthralgias. Negative for myalgias.   Skin:  Negative for rash and wound.   Neurological:  Negative for dizziness, syncope and headaches.   Psychiatric/Behavioral:  Negative for confusion and hallucinations.    All other systems reviewed and are negative.       Physical Exam     GEN:  awake, alert, not in acute distress  HEENT:  normocephalic, atraumatic, PERRL, EOM intact, nares  "patent  Cardio:  RRR, no murmurs  Resp:  CTAB, no wheezing  Abd:  +BS, soft, nontender  :  deferred  Neuro:  A&Ox3, CN2-12 grossly intact, no focal deficits  Msk:  left hip pain  Skin:  warm, dry, intact  Psych:  appropriate in mood and behavior     Last Recorded Vitals  Blood pressure 160/79, pulse 67, temperature 36.7 °C (98.1 °F), resp. rate 19, height 1.778 m (5' 10\"), weight 105 kg (232 lb), SpO2 98%.    Relevant Results    XR chest 1 view    Result Date: 2/17/2025  STUDY: Chest Radiograph;  2/17/2025 at 3:10 a.m. INDICATION: Preop. COMPARISON: Two-view CXR 12/8/2022. ACCESSION NUMBER(S): [id number] ORDERING CLINICIAN: JULIO DIXON TECHNIQUE:  Frontal chest was obtained at 03:10:00 hours. FINDINGS: Lines and devices: No lines or devices. Cardiovascular: Heart size is normal. Pulmonary vascularity is within normal limits. Lungs and pleura: Increasing left midlung groundglass opacity in groundglass opacity within the right base. No visible pneumothorax or pleural effusion.    Increasing left midlung groundglass opacity and groundglass opacity within the right base. This may represent increasing fibrosis in the setting of chronic interstitial lung disease. Superimposed pneumonia could have this appearance in the correct clinical setting. Signed by Mac Connell MD    XR hip left with pelvis when performed 1 view    Result Date: 2/17/2025  STUDY: Hip Radiographs; 2/17/2025 at 2:28 PM. INDICATION: Post reduction. COMPARISON: XR left hip 2/17/2025 (two studies), 8/24/2024; XR pelvis 8/24/2024. ACCESSION NUMBER(S): [id number] ORDERING CLINICIAN: ELYSE H KLERMAN TECHNIQUE:  One view(s) of the left hip. FINDINGS/    Left hip replacement with persistent dislocation. Alignment is unchanged from prior exam. Signed by Mac Connell MD    XR hip left with pelvis when performed 1 view    Result Date: 2/17/2025  STUDY: Hip Radiographs; 2/17/2025 at 2:28 AM. INDICATION: Post reduction. COMPARISON: XR left hip " 2/17/2025, 8/24/2024; XR pelvis 2/17/2025. ACCESSION NUMBER(S): [id number] ORDERING CLINICIAN: ELYSE H KLERMAN TECHNIQUE:  One view(s) of the left hip. FINDINGS/    Left hip replacement with persistent dislocation. Alignment is unchanged from prior exam. Signed by Mac Connell MD    XR hip left with pelvis when performed 1 view    Result Date: 2/17/2025  STUDY: Hip Radiographs; 2/17/2025 at 2:28 AM. INDICATION: Post reduction. COMPARISON: XR pelvis 2/17/2025; XR left hip 8/24/2024. ACCESSION NUMBER(S): [id number] ORDERING CLINICIAN: ELYSE H KLERMAN TECHNIQUE:  One view(s) of the left hip. FINDINGS/    Left hip replacement with persistent dislocation. Alignment is unchanged from prior exam. Signed by Mac Connell MD    CT head wo IV contrast    Result Date: 2/17/2025  Interpreted By:  Carol Diaz, STUDY: CT HEAD WO IV CONTRAST; CT CERVICAL SPINE WO IV CONTRAST;  2/17/2025 12:06 am   INDICATION: Signs/Symptoms:fall.   COMPARISON: CT head 06/27/2021.   ACCESSION NUMBER(S): [id number]; [id number]   ORDERING CLINICIAN: JULIO DIXON   TECHNIQUE: Axial noncontrast images of the head  with coronal  and sagittal reconstructed images . Axial noncontrast images of the cervical spine with coronal and sagittal reconstructed images.   FINDINGS: BRAIN PARENCHYMA: The gray-white matter interfaces are preserved. No acute territorial infarct, mass effect or midline shift. HEMORRHAGE: No acute intracranial hemorrhage. VENTRICLES and EXTRA-AXIAL SPACES: Normal size. EXTRACRANIAL SOFT TISSUES: Within normal limits. CALVARIUM: No depressed calvarial fracture. PARANASAL SINUSES: No air-fluid levels. Mild mucosal thickening at the bilateral frontal sinuses, bilateral maxillary sinuses and at the ethmoid air cells. MASTOIDS: Within normal limits.   CERVICAL SPINE: ALIGNMENT: There is straightening of the cervical lordosis. No traumatic facet widening. VERTEBRAE: No acute fracture. DISCS: There is multilevel degenerative  disc disease with extensive anterior osteophytosis. PREVERTEBRAL SOFT TISSUES: No prevertebral soft tissue swelling.       1.  No acute intracranial hemorrhage or depressed calvarial fracture. 2. No acute fracture at the cervical spine. Degenerative changes.       MACRO: None.   Signed by: Carol Diaz 2/17/2025 12:44 AM Dictation workstation:   [id number]    CT cervical spine wo IV contrast    Result Date: 2/17/2025  Interpreted By:  Carol Diaz, STUDY: CT HEAD WO IV CONTRAST; CT CERVICAL SPINE WO IV CONTRAST;  2/17/2025 12:06 am   INDICATION: Signs/Symptoms:fall.   COMPARISON: CT head 06/27/2021.   ACCESSION NUMBER(S): [id number]; [id number]   ORDERING CLINICIAN: JULIO DIXON   TECHNIQUE: Axial noncontrast images of the head  with coronal  and sagittal reconstructed images . Axial noncontrast images of the cervical spine with coronal and sagittal reconstructed images.   FINDINGS: BRAIN PARENCHYMA: The gray-white matter interfaces are preserved. No acute territorial infarct, mass effect or midline shift. HEMORRHAGE: No acute intracranial hemorrhage. VENTRICLES and EXTRA-AXIAL SPACES: Normal size. EXTRACRANIAL SOFT TISSUES: Within normal limits. CALVARIUM: No depressed calvarial fracture. PARANASAL SINUSES: No air-fluid levels. Mild mucosal thickening at the bilateral frontal sinuses, bilateral maxillary sinuses and at the ethmoid air cells. MASTOIDS: Within normal limits.   CERVICAL SPINE: ALIGNMENT: There is straightening of the cervical lordosis. No traumatic facet widening. VERTEBRAE: No acute fracture. DISCS: There is multilevel degenerative disc disease with extensive anterior osteophytosis. PREVERTEBRAL SOFT TISSUES: No prevertebral soft tissue swelling.       1.  No acute intracranial hemorrhage or depressed calvarial fracture. 2. No acute fracture at the cervical spine. Degenerative changes.       MACRO: None.   Signed by: Carol Diaz 2/17/2025 12:44 AM Dictation workstation:    [id number]    XR femur left 2+ views    Result Date: 2/17/2025  STUDY: Femur Radiographs; 2/17/2025  00:22 INDICATION: Status post fall. COMPARISON: Pelvis and left hip radiograph 8/24/2024 ACCESSION NUMBER(S): [id number] ORDERING CLINICIAN: JULIO DIXON TECHNIQUE:  Two views (four images) of the left femur. FINDINGS:  Left total hip prosthesis is reidentified associated with complete superior dislocation of the left femoral head prosthesis.  Left hip prosthesis otherwise appears intact.  Visualized portion of the more distal left femur also appears intact.  No soft tissue abnormality is seen.    Complete superior dislocation of the left hip prosthesis.  No evidence of acute displaced fracture. Signed by Matthew Zamora MD    XR pelvis 1-2 views    Result Date: 2/17/2025  STUDY: Pelvis Radiographs; 2/17/2025 at 12:22 AM. INDICATION: Fall. COMPARISON: XR left hip/pelvis 8/22/2024. ACCESSION NUMBER(S): [id number] ORDERING CLINICIAN: JULIO DIXON TECHNIQUE:  One view(s) of the pelvis. FINDINGS:  There is a left total hip replacement. There is a dislocation of the joint with the femoral component displaced superiorly with respect to the acetabular cup. No fracture is seen. Alignment is maintained at the right hip.    Dislocation of the left total hip replacement. Signed by Mac Connell MD        Assessment/Plan   Assessment & Plan  Recurrent dislocation, left hip    Fall    Recurrent dislocation of hip    # Recurrent left hip dislocation, h/o left hip replacement  # Fall    HTN  HLD  CAD s/p stents  COPD  BPH   GERD    Pain control, keep patient NPO for now.  Monitor vital signs.  DVT prophylaxis.  Home medications need to be entered by RN so we can reconcile home medications.       I spent 50 minutes in the professional and overall care of this patient.    Caleb Miller DO  Senior Attending Physician  Hospital Medicine  Clinical Instructor

## 2025-02-17 NOTE — CONSULTS
"Reason For Consult  Left total hip dislocation    History Of Present Illness  Brandon Link is a 69 y.o. male presenting with left hip pain.  [facility]  He has had 1 previous dislocation event.  He used a hip abduction brace after this.  He fell last night and sustained a dislocation.  Attempts at closed reduction in the emergency room were done but were unsuccessful.     Past Medical History  Hypertension, hyperlipidemia, coronary artery disease status post stents, COPD, diabetes, reflux, BPH    Surgical History  He has a past surgical history that includes MR angio head wo IV contrast (6/28/2021) and MR angio neck wo IV contrast (6/28/2021).     Social History  He has no history on file for tobacco use, alcohol use, and drug use.    Family History  No family history on file.     Allergies  Beta-blockers (beta-adrenergic blocking agts) and Atorvastatin    Review of Systems  Reviewed     Physical Exam  Pleasant in no acute distress.  Left lower extremity is shortened and internally rotated.  The lower extremities adequately perfused.     Last Recorded Vitals  Blood pressure (!) 189/89, pulse 75, temperature 36 °C (96.8 °F), temperature source Temporal, resp. rate 16, height 1.778 m (5' 10\"), weight 105 kg (231 lb 14.8 oz), SpO2 100%.    Relevant Results  XR chest 1 view    Result Date: 2/17/2025  STUDY: Chest Radiograph;  2/17/2025 at 3:10 a.m. INDICATION: Preop. COMPARISON: Two-view CXR 12/8/2022. ACCESSION NUMBER(S): [id number] ORDERING CLINICIAN: JULIO DIXON TECHNIQUE:  Frontal chest was obtained at 03:10:00 hours. FINDINGS: Lines and devices: No lines or devices. Cardiovascular: Heart size is normal. Pulmonary vascularity is within normal limits. Lungs and pleura: Increasing left midlung groundglass opacity in groundglass opacity within the right base. No visible pneumothorax or pleural effusion.    Increasing left midlung groundglass opacity and groundglass " opacity within the right base. This may represent increasing fibrosis in the setting of chronic interstitial lung disease. Superimposed pneumonia could have this appearance in the correct clinical setting. Signed by Mac Connell MD    XR hip left with pelvis when performed 1 view    Result Date: 2/17/2025  STUDY: Hip Radiographs; 2/17/2025 at 2:28 PM. INDICATION: Post reduction. COMPARISON: XR left hip 2/17/2025 (two studies), 8/24/2024; XR pelvis 8/24/2024. ACCESSION NUMBER(S): [id number] ORDERING CLINICIAN: ELYSE H KLERMAN TECHNIQUE:  One view(s) of the left hip. FINDINGS/    Left hip replacement with persistent dislocation. Alignment is unchanged from prior exam. Signed by Mac Connell MD    XR hip left with pelvis when performed 1 view    Result Date: 2/17/2025  STUDY: Hip Radiographs; 2/17/2025 at 2:28 AM. INDICATION: Post reduction. COMPARISON: XR left hip 2/17/2025, 8/24/2024; XR pelvis 2/17/2025. ACCESSION NUMBER(S): [id number] ORDERING CLINICIAN: ELYSE H KLERMAN TECHNIQUE:  One view(s) of the left hip. FINDINGS/    Left hip replacement with persistent dislocation. Alignment is unchanged from prior exam. Signed by Mac Connell MD    XR hip left with pelvis when performed 1 view    Result Date: 2/17/2025  STUDY: Hip Radiographs; 2/17/2025 at 2:28 AM. INDICATION: Post reduction. COMPARISON: XR pelvis 2/17/2025; XR left hip 8/24/2024. ACCESSION NUMBER(S): [id number] ORDERING CLINICIAN: ELYSE H KLERMAN TECHNIQUE:  One view(s) of the left hip. FINDINGS/    Left hip replacement with persistent dislocation. Alignment is unchanged from prior exam. Signed by Mac Connell MD    CT head wo IV contrast    Result Date: 2/17/2025  Interpreted By:  Carol Diaz, STUDY: CT HEAD WO IV CONTRAST; CT CERVICAL SPINE WO IV CONTRAST;  2/17/2025 12:06 am   INDICATION: Signs/Symptoms:fall.   COMPARISON: CT head 06/27/2021.   ACCESSION NUMBER(S): [id number]; [id number]   ORDERING CLINICIAN: JULIO DIXON    TECHNIQUE: Axial noncontrast images of the head  with coronal  and sagittal reconstructed images . Axial noncontrast images of the cervical spine with coronal and sagittal reconstructed images.   FINDINGS: BRAIN PARENCHYMA: The gray-white matter interfaces are preserved. No acute territorial infarct, mass effect or midline shift. HEMORRHAGE: No acute intracranial hemorrhage. VENTRICLES and EXTRA-AXIAL SPACES: Normal size. EXTRACRANIAL SOFT TISSUES: Within normal limits. CALVARIUM: No depressed calvarial fracture. PARANASAL SINUSES: No air-fluid levels. Mild mucosal thickening at the bilateral frontal sinuses, bilateral maxillary sinuses and at the ethmoid air cells. MASTOIDS: Within normal limits.   CERVICAL SPINE: ALIGNMENT: There is straightening of the cervical lordosis. No traumatic facet widening. VERTEBRAE: No acute fracture. DISCS: There is multilevel degenerative disc disease with extensive anterior osteophytosis. PREVERTEBRAL SOFT TISSUES: No prevertebral soft tissue swelling.       1.  No acute intracranial hemorrhage or depressed calvarial fracture. 2. No acute fracture at the cervical spine. Degenerative changes.       MACRO: None.   Signed by: Carol Diaz 2/17/2025 12:44 AM Dictation workstation:   [id number]    CT cervical spine wo IV contrast    Result Date: 2/17/2025  Interpreted By:  Carol Diaz, STUDY: CT HEAD WO IV CONTRAST; CT CERVICAL SPINE WO IV CONTRAST;  2/17/2025 12:06 am   INDICATION: Signs/Symptoms:fall.   COMPARISON: CT head 06/27/2021.   ACCESSION NUMBER(S): [id number]; [id number]   ORDERING CLINICIAN: JULIO DIXON   TECHNIQUE: Axial noncontrast images of the head  with coronal  and sagittal reconstructed images . Axial noncontrast images of the cervical spine with coronal and sagittal reconstructed images.   FINDINGS: BRAIN PARENCHYMA: The gray-white matter interfaces are preserved. No acute territorial infarct, mass effect or midline shift. HEMORRHAGE: No acute  intracranial hemorrhage. VENTRICLES and EXTRA-AXIAL SPACES: Normal size. EXTRACRANIAL SOFT TISSUES: Within normal limits. CALVARIUM: No depressed calvarial fracture. PARANASAL SINUSES: No air-fluid levels. Mild mucosal thickening at the bilateral frontal sinuses, bilateral maxillary sinuses and at the ethmoid air cells. MASTOIDS: Within normal limits.   CERVICAL SPINE: ALIGNMENT: There is straightening of the cervical lordosis. No traumatic facet widening. VERTEBRAE: No acute fracture. DISCS: There is multilevel degenerative disc disease with extensive anterior osteophytosis. PREVERTEBRAL SOFT TISSUES: No prevertebral soft tissue swelling.       1.  No acute intracranial hemorrhage or depressed calvarial fracture. 2. No acute fracture at the cervical spine. Degenerative changes.       MACRO: None.   Signed by: Carol Diaz 2/17/2025 12:44 AM Dictation workstation:   [id number]    XR femur left 2+ views    Result Date: 2/17/2025  STUDY: Femur Radiographs; 2/17/2025  00:22 INDICATION: Status post fall. COMPARISON: Pelvis and left hip radiograph 8/24/2024 ACCESSION NUMBER(S): [id number] ORDERING CLINICIAN: JULIO DIXON TECHNIQUE:  Two views (four images) of the left femur. FINDINGS:  Left total hip prosthesis is reidentified associated with complete superior dislocation of the left femoral head prosthesis.  Left hip prosthesis otherwise appears intact.  Visualized portion of the more distal left femur also appears intact.  No soft tissue abnormality is seen.    Complete superior dislocation of the left hip prosthesis.  No evidence of acute displaced fracture. Signed by Matthew Zamora MD    XR pelvis 1-2 views    Result Date: 2/17/2025  STUDY: Pelvis Radiographs; 2/17/2025 at 12:22 AM. INDICATION: Fall. COMPARISON: XR left hip/pelvis 8/22/2024. ACCESSION NUMBER(S): [id number] ORDERING CLINICIAN: JULIO DIXON TECHNIQUE:  One view(s) of the pelvis. FINDINGS:  There is a left total hip replacement. There is a  dislocation of the joint with the femoral component displaced superiorly with respect to the acetabular cup. No fracture is seen. Alignment is maintained at the right hip.    Dislocation of the left total hip replacement. Signed by Mac Connell MD      Scheduled medications  enoxaparin, 40 mg, subcutaneous, q24h  [Transfer Hold] insulin lispro, 0-5 Units, subcutaneous, q4h      Continuous medications  oxygen, , Last Rate: 4 L/min (02/17/25 0215)  sodium chloride 0.9%, 125 mL/hr, Last Rate: 125 mL/hr (02/17/25 7799)      PRN medications  PRN medications: acetaminophen, [Transfer Hold] albuterol, [Transfer Hold] dextrose, [Transfer Hold] dextrose, [Transfer Hold] glucagon, [Transfer Hold] glucagon, HYDROmorphone, [COMPLETED] propofol **FOLLOWED BY** propofol  Results for orders placed or performed during the hospital encounter of 02/16/25 (from the past 24 hours)   CBC and Auto Differential   Result Value Ref Range    WBC 7.8 4.4 - 11.3 x10*3/uL    nRBC 0.0 0.0 - 0.0 /100 WBCs    RBC 4.90 4.50 - 5.90 x10*6/uL    Hemoglobin 15.1 13.5 - 17.5 g/dL    Hematocrit 44.6 41.0 - 52.0 %    MCV 91 80 - 100 fL    MCH 30.8 26.0 - 34.0 pg    MCHC 33.9 32.0 - 36.0 g/dL    RDW 12.9 11.5 - 14.5 %    Platelets 238 150 - 450 x10*3/uL    Neutrophils % 76.4 40.0 - 80.0 %    Immature Granulocytes %, Automated 0.6 0.0 - 0.9 %    Lymphocytes % 14.3 13.0 - 44.0 %    Monocytes % 6.4 2.0 - 10.0 %    Eosinophils % 1.8 0.0 - 6.0 %    Basophils % 0.5 0.0 - 2.0 %    Neutrophils Absolute 5.97 1.20 - 7.70 x10*3/uL    Immature Granulocytes Absolute, Automated 0.05 0.00 - 0.70 x10*3/uL    Lymphocytes Absolute 1.12 (L) 1.20 - 4.80 x10*3/uL    Monocytes Absolute 0.50 0.10 - 1.00 x10*3/uL    Eosinophils Absolute 0.14 0.00 - 0.70 x10*3/uL    Basophils Absolute 0.04 0.00 - 0.10 x10*3/uL   Comprehensive metabolic panel   Result Value Ref Range    Glucose 149 (H) 74 - 99 mg/dL    Sodium 136 136 - 145 mmol/L    Potassium 4.1 3.5 - 5.3 mmol/L    Chloride 103  98 - 107 mmol/L    Bicarbonate 23 21 - 32 mmol/L    Anion Gap 14 10 - 20 mmol/L    Urea Nitrogen 18 6 - 23 mg/dL    Creatinine 0.95 0.50 - 1.30 mg/dL    eGFR 87 >60 mL/min/1.73m*2    Calcium 9.1 8.6 - 10.3 mg/dL    Albumin 4.4 3.4 - 5.0 g/dL    Alkaline Phosphatase 53 33 - 136 U/L    Total Protein 7.6 6.4 - 8.2 g/dL    AST 20 9 - 39 U/L    Bilirubin, Total 0.2 0.0 - 1.2 mg/dL    ALT 21 10 - 52 U/L   Type And Screen   Result Value Ref Range    ABO TYPE A     Rh TYPE POS     ANTIBODY SCREEN NEG    Protime-INR   Result Value Ref Range    Protime 11.4 9.8 - 12.8 seconds    INR 1.0 0.9 - 1.1   POCT GLUCOSE   Result Value Ref Range    POCT Glucose 134 (H) 74 - 99 mg/dL        Assessment/Plan     69-year-old with left total hip arthroplasty dislocation.  Treatment options including no treat reviewed and decision was made proceed with closed reduction in the operating room.    Brandon Burt MD

## 2025-02-17 NOTE — ANESTHESIA PREPROCEDURE EVALUATION
Patient: Brandon Link    Procedure Information       Anesthesia Start Date/Time: 02/17/25 0708    Procedure: CLOSED REDUCTION, DISLOCATION, TOTAL ARTHROPLASTY HARDWARE, HIP (Left: Hip)    Location: [facility] / [facility]    Surgeons: Brandon Burt MD            Relevant Problems   No relevant active problems       Clinical information reviewed:    Allergies   Problems              NPO Detail:  NPO/Void Status  Carbohydrate Drink Given Prior to Surgery? : N  Date of Last Liquid: 02/17/25  Time of Last Liquid: 0300  Date of Last Solid: 02/16/25  Time of Last Solid: 2300  Last Intake Type: Clear fluids         Physical Exam    Airway  Mallampati: III  TM distance: >3 FB  Neck ROM: full     Cardiovascular - normal exam     Dental - normal exam       Pulmonary - normal exam     Abdominal   (+) obese             Anesthesia Plan    History of general anesthesia?: yes  History of complications of general anesthesia?: no    ASA 2 - emergent     general     The patient is not a current smoker.  Patient was previously instructed to abstain from smoking on day of procedure.  Patient did not smoke on day of procedure.    intravenous induction   Anesthetic plan and risks discussed with patient.

## 2025-02-17 NOTE — ANESTHESIA POSTPROCEDURE EVALUATION
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    Anesthesia Post Evaluation    Patient location during evaluation: PACU  Patient participation: complete - patient participated  Level of consciousness: awake and alert  Pain score: 0  Pain management: adequate  Airway patency: patent  Cardiovascular status: acceptable  Respiratory status: acceptable  Hydration status: acceptable  Postoperative Nausea and Vomiting: none        No notable events documented.

## 2025-02-18 LAB
ATRIAL RATE: 73 BPM
P AXIS: -5 DEGREES
PR INTERVAL: 59 MS
Q ONSET: 251 MS
QRS COUNT: 11 BEATS
QRS DURATION: 109 MS
QT INTERVAL: 420 MS
QTC CALCULATION(BAZETT): 457 MS
QTC FREDERICIA: 444 MS
R AXIS: 10 DEGREES
T AXIS: 8 DEGREES
T OFFSET: 461 MS
VENTRICULAR RATE: 71 BPM

## 2025-02-18 ASSESSMENT — PAIN SCALES - GENERAL: PAINLEVEL_OUTOF10: 5 - MODERATE PAIN

## 2025-05-21 ENCOUNTER — HOSPITAL ENCOUNTER (OUTPATIENT)
Dept: RADIOLOGY | Facility: CLINIC | Age: 70
Discharge: HOME | End: 2025-05-21
Payer: MEDICARE

## 2025-05-21 ENCOUNTER — OFFICE VISIT (OUTPATIENT)
Dept: ORTHOPEDIC SURGERY | Facility: CLINIC | Age: 70
End: 2025-05-21
Payer: MEDICARE

## 2025-05-21 DIAGNOSIS — T84.021A FAILURE OF LEFT TOTAL HIP ARTHROPLASTY WITH DISLOCATION OF HIP, INITIAL ENCOUNTER: Primary | ICD-10-CM

## 2025-05-21 DIAGNOSIS — Z96.642 S/P TOTAL LEFT HIP ARTHROPLASTY: ICD-10-CM

## 2025-05-21 PROCEDURE — 73502 X-RAY EXAM HIP UNI 2-3 VIEWS: CPT | Mod: LEFT SIDE | Performed by: RADIOLOGY

## 2025-05-21 PROCEDURE — 99204 OFFICE O/P NEW MOD 45 MIN: CPT | Performed by: STUDENT IN AN ORGANIZED HEALTH CARE EDUCATION/TRAINING PROGRAM

## 2025-05-21 PROCEDURE — 99214 OFFICE O/P EST MOD 30 MIN: CPT | Performed by: STUDENT IN AN ORGANIZED HEALTH CARE EDUCATION/TRAINING PROGRAM

## 2025-05-21 PROCEDURE — 72170 X-RAY EXAM OF PELVIS: CPT | Mod: LEFT SIDE | Performed by: RADIOLOGY

## 2025-05-21 PROCEDURE — 1159F MED LIST DOCD IN RCRD: CPT | Performed by: STUDENT IN AN ORGANIZED HEALTH CARE EDUCATION/TRAINING PROGRAM

## 2025-05-21 PROCEDURE — 73502 X-RAY EXAM HIP UNI 2-3 VIEWS: CPT | Mod: LT

## 2025-05-21 PROCEDURE — 72170 X-RAY EXAM OF PELVIS: CPT

## 2025-05-21 NOTE — PROGRESS NOTES
PRIMARY CARE PHYSICIAN: Jeb Adkins MD  REFERRING PROVIDER: No referring provider defined for this encounter.       SUBJECTIVE  CHIEF COMPLAINT:   Chief Complaint   Patient presents with    Left Hip - New Patient Visit, Pain        HPI: Brandon Link is a pleasant 69 y.o. year-old male who is seen today for evaluation of left hip instability.  [facility]  Surgeon was Dr. Rodrigues.  The patient has a DePuy Tri-Lock femoral stem high offset size 8.  The [place] acetabular shell 58 mm in diameter.  A single 6.5 mm x 20 mm screw.  A 36 mm inner diameter liner with an elevated lip.  A 36 x +1.5 neck length.    The patient unfortunately has sustained 2 dislocations.  The first was in October 2024 which was approximately 3 months after his surgery and the second was in February 25.  In October, the patient was reaching down to grab something off the floor when his hip dislocated.  In February, the hip dislocation occurred as a result of a fall.    He has occasional pain in the buttock.  He does have a history of low back pain.  He has never had surgery on his low back before.  No previous surgery on his hip either.  With respect to his recovery from his total joint replacement, no history of wound complications or infection.    The patient does have a cardiac history.  He does have stents.  He uses a rollator or a cart when he is out.  Patient also does have a history of diabetes.  He is on aspirin and Plavix.  He has recently seen a dentist.    Currently, the patient does feel limited in his ability perform activities of daily living.  His index surgeon has asked that he maintain posterior hip precautions which he finds to be restrictive with respect to his desire to work around the house and performing other recreational activities.    REVIEW OF SYSTEMS  The patient denies any fever, chills, chest pain, shortness of breath or difficulty breathing.  Patient  denies any numbness, tingling, or radicular symptoms.  Adult patient history sheet was filled out by the patient today in clinic and will be scanned into the EMR.  I personally reviewed this form which will be scanned into the EMR.  This includes Past Medical History, Past Surgical History, Medications, Allergies, Social History, Family History and 12 point review of systems.    Medical History[1]     Allergies[2]     Surgical History[3]     Family History[4]     Social History     Socioeconomic History    Marital status:      Spouse name: Not on file    Number of children: Not on file    Years of education: Not on file    Highest education level: Not on file   Occupational History    Not on file   Tobacco Use    Smoking status: Unknown    Smokeless tobacco: Not on file   Substance and Sexual Activity    Alcohol use: Not on file    Drug use: Not on file    Sexual activity: Not on file   Other Topics Concern    Not on file   Social History Narrative    Not on file     Social Drivers of Health     Financial Resource Strain: Not on file   Food Insecurity: Not on file   Transportation Needs: Not on file   Physical Activity: Not on file   Stress: Not on file   Social Connections: Not on file   Intimate Partner Violence: Not on file   Housing Stability: Not on file        CURRENT MEDICATIONS:   Current Medications[5]     OBJECTIVE    PHYSICAL EXAM  There is no height or weight on file to calculate BMI.    General: Well-appearing male in no acute distress.  Awake, alert and oriented.  Pleasant and cooperative.  Respiratory: Non-labored breathing  Mood: Euthymic   Gait: Antalgic  Assistive Device: None     Limb Length Discrepancy: Even    Affected Left Hip  Range of motion:   Full range of motion not assessed due to the history of instability.  However, the patient is able to tolerate about 40 degrees of flexion and some internal/external rotation without pain.  Hip Flexor Strength: 5/5  Abductor Strength:  5/5  Adductor Strength: 5/5  Tenderness: Patient is tender to palpation in the buttock.  Sensation: Intact to light touch distally  Motor function: Able to fire TA, EHL, G/S  Pulses: Palpable DP pulse      IMAGING:  Multiple radiographs of the left hip were obtained independently reviewed.  I discussed the results with the patient.  The patient has a press-fit total hip arthroplasty with a single screw.  No obvious signs of failure.  The acetabular component is relatively under anteverted as compared to what I would expect for a gentleman with his amount of lumbar DJD.  He does have evidence of degenerative arthritis in the lumbar spine.    ASSESSMENT & PLAN    IMPRESSION:  Brandon Link is a 69 y.o. male who is now approximately 1 year status post total hip arthroplasty.  Postoperative course complicated by multiple dislocations.  Radiographs demonstrated under anteverted acetabular component.    PLAN:  We discussed treatment options.  Certainly, the patient can continue to live with his current component.  However, I would agree with his surgeon that he should maintain strict posterior precautions if he were to keep his current implants.  Alternatively, we did discuss the possibility of an open revision.    In my opinion, I think the patient would be best served with a acetabular component revision to add anteversion and dual mobility to the system.    We discussed the risks of surgery including infection, limb length discrepancy, need for additional surgery, pain after surgery, failure of osteointegration, recurrent dislocation, heart attack, stroke and death.  The patient is willing to move forward with surgery.  He has seen a dentist in the last 2 months.  [facility].  We will also need to investigate when he can discontinue his Plavix in order to have the operation.    I do believe that the patient is appropriately indicated for revision arthroplasty given his  multiple dislocations in the short interval since he is have a hip replacement.    All questions were answered.  I will see the patient back for preoperative visit.    CPT 54122.  [place].  Overnight admission.  DePuy emphasis and [place] cup with dual mobility liners.  Acetabular explant equipment.  Inpatient admission    *This note was created using voice recognition software and was not corrected for typographical or grammatical errors.*                               [1] History reviewed. No pertinent past medical history.  [2]   Allergies  Allergen Reactions    Beta-Blockers (Beta-Adrenergic Blocking Agts) Shortness of breath    Atorvastatin Other     Weakness    [3]   Past Surgical History:  Procedure Laterality Date    MR HEAD ANGIO WO IV CONTRAST  6/28/2021    MR HEAD ANGIO WO IV CONTRAST 6/28/2021 [facility]    MR NECK ANGIO WO IV CONTRAST  6/28/2021    MR NECK ANGIO WO IV CONTRAST 6/28/2021 [facility]   [4] No family history on file.  [5]   No current outpatient medications on file.     No current facility-administered medications for this visit.

## 2025-05-28 PROBLEM — Z96.649 FAILED TOTAL HIP ARTHROPLASTY, SUBSEQUENT ENCOUNTER: Status: ACTIVE | Noted: 2025-05-21

## 2025-05-28 PROBLEM — T84.018D FAILED TOTAL HIP ARTHROPLASTY, SUBSEQUENT ENCOUNTER: Status: ACTIVE | Noted: 2025-05-21

## 2025-09-02 ENCOUNTER — HOSPITAL ENCOUNTER (OUTPATIENT)
Dept: RADIOLOGY | Facility: CLINIC | Age: 70
Discharge: HOME | End: 2025-09-02
Payer: MEDICARE

## 2025-09-02 ENCOUNTER — OFFICE VISIT (OUTPATIENT)
Dept: ORTHOPEDIC SURGERY | Facility: CLINIC | Age: 70
End: 2025-09-02
Payer: MEDICARE

## 2025-09-02 DIAGNOSIS — M16.12 PRIMARY LOCALIZED OSTEOARTHRITIS OF LEFT HIP: ICD-10-CM

## 2025-09-02 DIAGNOSIS — M17.10 PRIMARY OSTEOARTHRITIS OF KNEE, UNSPECIFIED LATERALITY: ICD-10-CM

## 2025-09-02 DIAGNOSIS — M17.12 PRIMARY OSTEOARTHRITIS OF LEFT KNEE: ICD-10-CM

## 2025-09-02 PROCEDURE — 99212 OFFICE O/P EST SF 10 MIN: CPT

## 2025-09-02 PROCEDURE — 73502 X-RAY EXAM HIP UNI 2-3 VIEWS: CPT | Mod: LT

## 2025-09-02 PROCEDURE — 73502 X-RAY EXAM HIP UNI 2-3 VIEWS: CPT | Mod: LEFT SIDE | Performed by: RADIOLOGY

## 2025-10-22 ENCOUNTER — APPOINTMENT (OUTPATIENT)
Dept: ORTHOPEDIC SURGERY | Facility: CLINIC | Age: 70
End: 2025-10-22
Payer: MEDICARE

## (undated) DEVICE — PADDING, UNDERCAST, WEBRIL, 6 IN X 4 YD, REG, NS

## (undated) DEVICE — BANDAGE, ELASTIC, PREMIUM, SELF-CLOSE, 2 IN X 5 YD, STERILE

## (undated) DEVICE — BANDAGE, ELASTIC, PREMIUM, SELF-CLOSE, 6 IN X 5.5 YD, STERILE

## (undated) DEVICE — PADDING, UNDERCAST, WYTEX, 3 IN X 4 YD, STERILE

## (undated) DEVICE — BANDAGE, ELASTIC, PREMIUM, SELF-CLOSE, 3 IN X 5 YD, STERILE

## (undated) DEVICE — BANDAGE, ELASTIC, PREMIUM, SELF-CLOSE, 4 IN X 5.5 YD, STERILE

## (undated) DEVICE — PADDING, UNDERCAST, WEBRIL II, 4 IN X 4 YD, CRIMP, NS

## (undated) DEVICE — Device